# Patient Record
Sex: FEMALE | Race: WHITE | NOT HISPANIC OR LATINO | Employment: STUDENT | URBAN - NONMETROPOLITAN AREA
[De-identification: names, ages, dates, MRNs, and addresses within clinical notes are randomized per-mention and may not be internally consistent; named-entity substitution may affect disease eponyms.]

---
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
## 2025-05-10 ENCOUNTER — OFFICE VISIT (OUTPATIENT)
Age: 20
End: 2025-05-10

## 2025-05-10 VITALS
HEART RATE: 103 BPM | OXYGEN SATURATION: 100 % | SYSTOLIC BLOOD PRESSURE: 110 MMHG | DIASTOLIC BLOOD PRESSURE: 62 MMHG | TEMPERATURE: 99.3 F | HEIGHT: 63 IN | WEIGHT: 102 LBS | BODY MASS INDEX: 18.07 KG/M2 | RESPIRATION RATE: 20 BRPM

## 2025-05-10 DIAGNOSIS — R42 DIZZINESS: ICD-10-CM

## 2025-05-10 DIAGNOSIS — J02.9 SORE THROAT: ICD-10-CM

## 2025-05-10 DIAGNOSIS — Z11.59 SCREENING FOR VIRAL DISEASE: ICD-10-CM

## 2025-05-10 DIAGNOSIS — R09.81 NASAL CONGESTION: ICD-10-CM

## 2025-05-10 DIAGNOSIS — H65.93 MIDDLE EAR EFFUSION, BILATERAL: ICD-10-CM

## 2025-05-10 DIAGNOSIS — U07.1 COVID-19: Primary | ICD-10-CM

## 2025-05-10 LAB
INFLUENZA A ANTIBODY: NORMAL
INFLUENZA B ANTIBODY: NORMAL
Lab: ABNORMAL
QC PASS/FAIL: ABNORMAL
S PYO AG THROAT QL: NORMAL
SARS-COV-2, POC: DETECTED

## 2025-05-10 RX ORDER — LORATADINE PSEUDOEPHEDRINE SULFATE 10; 240 MG/1; MG/1
1 TABLET, EXTENDED RELEASE ORAL DAILY
Qty: 10 TABLET | Refills: 0 | Status: SHIPPED | OUTPATIENT
Start: 2025-05-10 | End: 2025-05-20

## 2025-05-10 RX ORDER — METHYLPREDNISOLONE 4 MG/1
TABLET ORAL
Qty: 1 KIT | Refills: 0 | Status: SHIPPED | OUTPATIENT
Start: 2025-05-10 | End: 2025-05-16

## 2025-05-10 RX ORDER — FLUTICASONE PROPIONATE 50 MCG
2 SPRAY, SUSPENSION (ML) NASAL DAILY
Qty: 16 G | Refills: 0 | Status: SHIPPED | OUTPATIENT
Start: 2025-05-10

## 2025-05-10 ASSESSMENT — ENCOUNTER SYMPTOMS
NAUSEA: 0
CONSTIPATION: 0
SORE THROAT: 1
RHINORRHEA: 0
EYE DISCHARGE: 0
VOMITING: 0
COLOR CHANGE: 0
SHORTNESS OF BREATH: 0
CHEST TIGHTNESS: 0
ABDOMINAL DISTENTION: 0
DIARRHEA: 0
WHEEZING: 0
EYE PAIN: 0
SINUS PRESSURE: 0
ABDOMINAL PAIN: 0
COUGH: 1
APNEA: 0
EYE ITCHING: 0
SINUS PAIN: 0

## 2025-05-10 NOTE — PROGRESS NOTES
[facility], [facility] ([place])  [facility]  [address]  [facility] [address]  Dept: [phone]  Dept Fax: [phone]    Mariaelena Montelongo is a 19 y.o. female who presents today for her medical conditions/complaints as noted below.  Mariaelena Montelongo is c/o of Pharyngitis (Started this AM, pt states her coworker was sick. ), Congestion, Fever (102.4 pt treated with tylenol x30 minutes ago. ), and Dizziness        HPI:     Mariaelena Montelongo presents with complaints of sore throat, congestion, cough, dizziness and fever. Patient states her symptoms started this morning. Her fever has been running around 102.4 F. Low grade fever of 99.3 F in the clinic currently. She is taking tylenol OTC, her last dose was around 30 minutes ago. She has also taken mucinex OTC. She was recently exposed to sickness at work; one of her co-workers is sick with similar symptoms and she tested positive for human meta pneumovirus. She denies any other symptoms, including shortness of  breath or wheezing. She is very ill-appearing, but she stable at this time. Her mom is present in the clinic with her.     Denies any recent antibiotic or steroid administration.      History reviewed. No pertinent past medical history.  History reviewed. No pertinent surgical history.    History reviewed. No pertinent family history.    Social History     Tobacco Use    Smoking status: Never    Smokeless tobacco: Never   Substance Use Topics    Alcohol use: Never      Current Outpatient Medications   Medication Sig Dispense Refill    loratadine-pseudoephedrine (CLARITIN-D 24 HOUR)  MG per extended release tablet Take 1 tablet by mouth daily for 10 days 10 tablet 0    fluticasone (FLONASE) 50 MCG/ACT nasal spray 2 sprays by Each Nostril route daily 16 g 0    methylPREDNISolone (MEDROL DOSEPACK) 4 MG tablet Take by mouth. 1 kit 0    sertraline (ZOLOFT) 25 MG tablet Take 1 tablet by mouth nightly (Patient not taking: Reported on

## 2025-05-10 NOTE — PATIENT INSTRUCTIONS
COVID-19    - COVID positive.  - Negative flu and strep test.  - Per CDC guidelines, quarantine x 5 days from symptoms onset, not considered contagious after fever free for 24 hours without any medication.  - Work note provided; return on 5/14/25.  - Medrol Dose Pack sent to pharmacy to treat dizziness. Take as prescribed. Risks/benefits discussed, patient gave informed consent.   - Claritin D and Flonase sent to pharmacy; take as prescribed.   - Rest.  - Increase fluid intake.  - Alternate Tylenol/Motrin as needed for fever/body aches.  - May take OTC oral antihistamine and nasal decongestant.  - May use saline nasal washes.  - Place a cool mist humidifier next to bed while sleeping.  - Return to the clinic or follow up with PCP if symptoms worsen or fail to improve.

## 2025-05-19 ENCOUNTER — OFFICE VISIT (OUTPATIENT)
Dept: OBSTETRICS AND GYNECOLOGY | Age: 20
End: 2025-05-19
Payer: COMMERCIAL

## 2025-05-19 VITALS
WEIGHT: 98 LBS | SYSTOLIC BLOOD PRESSURE: 104 MMHG | BODY MASS INDEX: 17.36 KG/M2 | DIASTOLIC BLOOD PRESSURE: 68 MMHG | HEIGHT: 63 IN

## 2025-05-19 DIAGNOSIS — Z00.00 WELL WOMAN EXAM WITHOUT GYNECOLOGICAL EXAM: Primary | ICD-10-CM

## 2025-05-19 DIAGNOSIS — N94.6 DYSMENORRHEA IN ADOLESCENT: ICD-10-CM

## 2025-05-19 RX ORDER — ETONOGESTREL AND ETHINYL ESTRADIOL VAGINAL RING .015; .12 MG/D; MG/D
1 RING VAGINAL
Qty: 3 EACH | Refills: 3 | Status: SHIPPED | OUTPATIENT
Start: 2025-05-19

## 2025-05-19 NOTE — PROGRESS NOTES
Chief Complaint   Patient presents with    Contraception     Patient is here for annual well GYN Exam. Not due for pap. Surveillance of contraception - NuvaRing. Pt is sexually active. Declines STD testing. Patient denies current pelvic pain, abnormal vaginal bleeding or discharge, dyspareunia, and voices no other complaints.        History:  Radha Lerner is a 19 y.o. female who presents today for evaluation of the above problems.    Patient presents today for annual and f/u on medication.   Periods are regular and lighter than before      ROS:  Review of Systems   Constitutional: Negative.    HENT: Negative.     Eyes: Negative.    Respiratory: Negative.     Cardiovascular: Negative.    Gastrointestinal: Negative.    Endocrine: Negative.    Genitourinary: Negative.    Musculoskeletal: Negative.    Skin: Negative.    Neurological: Negative.    Psychiatric/Behavioral: Negative.         No Known Allergies  Past Medical History:   Diagnosis Date    ADHD (attention deficit hyperactivity disorder)     Asthma     Disruptive mood dysregulation disorder 2022    Mood disorder      Past Surgical History:   Procedure Laterality Date    ADENOIDECTOMY      TONSILLECTOMY       Family History   Problem Relation Age of Onset    Melanoma Father     Hypertension Father     Heart disease Father     COPD Father     Endometriosis Sister     Stroke Maternal Grandfather     Hypertension Maternal Grandfather     Breast cancer Neg Hx     Ovarian cancer Neg Hx     Uterine cancer Neg Hx     Colon cancer Neg Hx       reports that she has never smoked. She has never used smokeless tobacco. She reports that she does not currently use alcohol. She reports that she does not currently use drugs.      Current Outpatient Medications:     etonogestrel-ethinyl estradiol (NuvaRing) 0.12-0.015 MG/24HR vaginal ring, Insert 1 each into the vagina Every 28 (Twenty-Eight) Days. Insert vaginally and leave in place for 3 consecutive weeks, then remove for 1  "week., Disp: 3 each, Rfl: 3    albuterol (ACCUNEB) 1.25 MG/3ML nebulizer solution, Take 3 mL by nebulization Every 6 (Six) Hours As Needed for Wheezing. (Patient not taking: Reported on 5/19/2025), Disp: 100 each, Rfl: 2    cyclobenzaprine (FLEXERIL) 10 MG tablet, Take 1 tablet by mouth 3 (Three) Times a Day As Needed for Muscle Spasms. (Patient not taking: Reported on 5/19/2025), Disp: 90 tablet, Rfl: 0    fluticasone (FLONASE) 50 MCG/ACT nasal spray, Daily. (Patient not taking: Reported on 5/19/2025), Disp: , Rfl:     ondansetron (ZOFRAN) 4 MG tablet, Every 8 (Eight) Hours. (Patient not taking: Reported on 5/19/2025), Disp: , Rfl:     sertraline (ZOLOFT) 25 MG tablet, Take 1 tablet by mouth Every Night. (Patient not taking: Reported on 5/19/2025), Disp: , Rfl:     triamcinolone (KENALOG) 0.1 % cream, Apply 1 application topically to the appropriate area as directed 2 (Two) Times a Day. (Patient not taking: Reported on 5/19/2025), Disp: 45 g, Rfl: 2    OBJECTIVE:  /68   Ht 160 cm (63\")   Wt 44.5 kg (98 lb)   LMP 05/13/2025 (Approximate)   BMI 17.36 kg/m²    Physical Exam  Constitutional:       Appearance: She is not ill-appearing.   Cardiovascular:      Heart sounds: Normal heart sounds.   Pulmonary:      Effort: No respiratory distress.      Breath sounds: Normal breath sounds.   Neurological:      Mental Status: She is oriented to person, place, and time.   Psychiatric:         Behavior: Behavior normal.         Assessment/Plan    Diagnoses and all orders for this visit:    1. Well woman exam without gynecological exam (Primary)  Comments:  under 21    2. Dysmenorrhea in adolescent  Comments:  doing well on current therapy  Orders:  -     etonogestrel-ethinyl estradiol (NuvaRing) 0.12-0.015 MG/24HR vaginal ring; Insert 1 each into the vagina Every 28 (Twenty-Eight) Days. Insert vaginally and leave in place for 3 consecutive weeks, then remove for 1 week.  Dispense: 3 each; Refill: 3        Preventative " and Immunizations:      - Tetanus: Unknown or >10 years ago. Recommend to have at pharmacy or on injury.      - Influenza: recommended annually      - Pneumovax:once after age 65      - Prevnar: Once after age 65      - Zostavax: Once after age 60  Colon Cancer Screening: Due at 45  Mammogram: Due at 40.  PAP: at age 21  DEXA:  BMD testing (DXA) in all women 65 years of age and older and in postmenopausal women younger than 65 years of age with clinical risk factors for fracture   COVID vaccine information is available at vaccine.[place].gov   Additional preventative recommendations in AVS.           An After Visit Summary was printed and given to the patient at discharge.  Return in about 1 year (around 5/19/2026) for Annual physical.          Tamika FELDER 5/19/2025   Electronically signed